# Patient Record
Sex: MALE | Race: BLACK OR AFRICAN AMERICAN | Employment: UNEMPLOYED | URBAN - METROPOLITAN AREA
[De-identification: names, ages, dates, MRNs, and addresses within clinical notes are randomized per-mention and may not be internally consistent; named-entity substitution may affect disease eponyms.]

---

## 2017-05-24 ENCOUNTER — OFFICE VISIT (OUTPATIENT)
Dept: FAMILY MEDICINE | Facility: CLINIC | Age: 67
End: 2017-05-24
Payer: COMMERCIAL

## 2017-05-24 VITALS — DIASTOLIC BLOOD PRESSURE: 98 MMHG | TEMPERATURE: 97 F | HEART RATE: 80 BPM | SYSTOLIC BLOOD PRESSURE: 158 MMHG

## 2017-05-24 DIAGNOSIS — Z71.84 TRAVEL ADVICE ENCOUNTER: Primary | ICD-10-CM

## 2017-05-24 DIAGNOSIS — Z23 NEED FOR VACCINATION: ICD-10-CM

## 2017-05-24 PROCEDURE — 90715 TDAP VACCINE 7 YRS/> IM: CPT | Mod: GA | Performed by: NURSE PRACTITIONER

## 2017-05-24 PROCEDURE — 90662 IIV NO PRSV INCREASED AG IM: CPT | Mod: GA | Performed by: NURSE PRACTITIONER

## 2017-05-24 PROCEDURE — 90670 PCV13 VACCINE IM: CPT | Mod: GA | Performed by: NURSE PRACTITIONER

## 2017-05-24 PROCEDURE — 90691 TYPHOID VACCINE IM: CPT | Mod: GA | Performed by: NURSE PRACTITIONER

## 2017-05-24 PROCEDURE — 99402 PREV MED CNSL INDIV APPRX 30: CPT | Mod: 25 | Performed by: NURSE PRACTITIONER

## 2017-05-24 PROCEDURE — 90472 IMMUNIZATION ADMIN EACH ADD: CPT | Mod: GA | Performed by: NURSE PRACTITIONER

## 2017-05-24 PROCEDURE — 90632 HEPA VACCINE ADULT IM: CPT | Mod: GA | Performed by: NURSE PRACTITIONER

## 2017-05-24 PROCEDURE — 90471 IMMUNIZATION ADMIN: CPT | Mod: GA | Performed by: NURSE PRACTITIONER

## 2017-05-24 RX ORDER — AZITHROMYCIN 500 MG/1
500 TABLET, FILM COATED ORAL DAILY
Qty: 3 TABLET | Refills: 0 | Status: SHIPPED | OUTPATIENT
Start: 2017-05-24

## 2017-05-24 RX ORDER — AZITHROMYCIN 500 MG/1
500 TABLET, FILM COATED ORAL DAILY
Qty: 3 TABLET | Refills: 0 | Status: SHIPPED | OUTPATIENT
Start: 2017-05-24 | End: 2017-05-24

## 2017-05-24 RX ORDER — ATOVAQUONE AND PROGUANIL HYDROCHLORIDE 250; 100 MG/1; MG/1
1 TABLET, FILM COATED ORAL DAILY
Qty: 94 TABLET | Refills: 0 | Status: SHIPPED | OUTPATIENT
Start: 2017-05-24

## 2017-05-24 RX ORDER — ATOVAQUONE AND PROGUANIL HYDROCHLORIDE 250; 100 MG/1; MG/1
1 TABLET, FILM COATED ORAL DAILY
Qty: 94 TABLET | Refills: 0 | Status: SHIPPED | OUTPATIENT
Start: 2017-05-24 | End: 2017-05-24

## 2017-05-24 NOTE — PROGRESS NOTES
Nurse Note      Itinerary:  [facility]       Departure Date: 2017      Return Date: 2017      Length of Trip 3 months       Reason for Travel: Brother passed away             Urban or rural: both      Accommodations: Family home        IMMUNIZATION HISTORY  Have you received any immunizations within the past 4 weeks?  No  Have you ever fainted from having your blood drawn or from an injection?  No  Have you ever had a fever reaction to vaccination?  No  Have you ever had any bad reaction or side effect from any vaccination?  No  Have you ever had hepatitis A or B vaccine?  No  Do you live (or work closely) with anyone who has AIDS, an AIDS-like condition, any other immune disorder or who is on chemotherapy for cancer?  No  Do you have a family history of immunodeficiency?  No  Have you received any injection of immune globulin or any blood products during the past 12 months?  No    Patient roomed by LUIS A Castñaeda  Augusta Avalos is a 67 year old male seen today with spouse for counsultation for international travel to [facility] for Visiting friends and relatives.  Patient will be departing in  6 day(s) and staying for   3 month(s) and  traveling alone.      Patient itinerary :  will be in the [place] [facility] [place] of [facility] which presents risk for Malaria, Dengue Fever, Chikungungya, Zika,  Trypanosomiasis, Schistosomiasis, Rabies, food borne illnesses, motor vehicle accidents, Typhoid, Leishmaniasis and Lassa Fever. exposure.      Patient's activities will include visiting friends and relatives and brothers .    Patient's country of birth is ?    Special medical concerns: Diabetes Type 1, hypertension, undervaccinated  Pre-travel questionnaire was completed by patient and reviewed by provider.     Vitals: BP (!) 158/98  Pulse 80  Temp 97  F (36.1  C) (Oral)  BMI= There is no height or weight on file to calculate BMI.    EXAM:  General:  Well-nourished,  well-developed in no acute distress.  Appears to be stated age, interacts appropriately and expresses understanding of information given to patient.    Current Outpatient Prescriptions   Medication Sig Dispense Refill     azithromycin (ZITHROMAX) 500 MG tablet Take 1 tablet (500 mg) by mouth daily Take 1 tablet a day for up to 3 days for severe diarrhea 3 tablet 0     atovaquone-proguanil (MALARONE) 250-100 MG per tablet Take 1 tablet by mouth daily Start 2 days before exposure to Malaria and continue daily till  7 days after exposure. 94 tablet 0     METFORMIN HCL PO Take 1,000 mg by mouth 2 times daily (with meals)       HYDROCHLOROTHIAZIDE PO Take 12.5 mg by mouth daily       SitaGLIPtin Phosphate (JANUVIA PO) Take 100 mg by mouth daily       SIMVASTATIN PO Take 20 mg by mouth At Bedtime       LISINOPRIL PO Take 10 mg by mouth daily       GLYBURIDE PO Take 5 mg by mouth 2 times daily (with meals)       There is no problem list on file for this patient.    No Known Allergies      Immunizations discussed include:   Hepatitis A:  Ordered/given today, risks, benefits and side effects reviewed  Hepatitis B: Declined  Not concerned about risk of disease  Influenza: Ordered/given today, risks, benefits and side effects reviewed  Typhoid: Ordered/given today, risks, benefits and side effects reviewed  Rabies: Declined  Not concerned about risk of disease  Yellow Fever: Declined  Not concerned about risk of disease, low risk due to destinations. Advanced age, type 1 diabetes  Japanese Encephalitis: Not indicated  Meningococcus: Not indicated  Tetanus/Diphtheria: Ordered/given today, risks, benefits and side effects reviewed  Measles/Mumps/Rubella: Immune by disease history per patient report  Cholera: Not needed  Polio: Up to date  Pneumococcal: Ordered/given today  Varicella: Declined  Not concerned about risk of disease  Zostavax:  deferred  HPV:  Not indicated  TB:  Possible post travel     Altitude Exposure on this  trip: no    ASSESSMENT/PLAN:    ICD-10-CM    1. Travel advice encounter Z71.89 TYPHOID VACCINE, IM     TDAP (ADACEL)     HEPA VACCINE ADULT IM     PNEUMOCOCCAL CONJ VACCINE 13 VALENT IM     azithromycin (ZITHROMAX) 500 MG tablet     atovaquone-proguanil (MALARONE) 250-100 MG per tablet     FLU VACCINE, INCREASED ANTIGEN, PRESV FREE     DISCONTINUED: azithromycin (ZITHROMAX) 500 MG tablet     DISCONTINUED: atovaquone-proguanil (MALARONE) 250-100 MG per tablet     CANCELED: HC FLU VAC PRESRV FREE QUAD SPLIT VIR 3+YRS IM   2. Need for vaccination Z23 TYPHOID VACCINE, IM     TDAP (ADACEL)     HEPA VACCINE ADULT IM     PNEUMOCOCCAL CONJ VACCINE 13 VALENT IM     FLU VACCINE, INCREASED ANTIGEN, PRESV FREE     CANCELED: HC FLU VAC PRESRV FREE QUAD SPLIT VIR 3+YRS IM     I have reviewed general recommendations for safe travel   including: food/water precautions, insect precautions, safer sex   practices given high prevalence of Zika, HIV and other STDs,   roadway safety. Educational materials and [employer] report provided.    Malaraia prophylaxis recommended: Malarone  Symptomatic treatment for traveler's diarrhea: azithromycin  Altitude illness prevention and treatment: no      Evacuation insurance advised and resources were provided to patient.    Total visit time 30 minutes  with over 50% of time spent counseling patient as detailed above.    Dayana Poe CNP

## 2017-05-24 NOTE — NURSING NOTE
Chief Complaint   Patient presents with     Travel Clinic     [facility]      BP (!) 170/108  Pulse 80  Temp 97  F (36.1  C) (Oral) There is no height or weight on file to calculate BMI.  bp completed using cuff size: large       Health Maintenance addressed:  BP was high, used pink card, recheck manually    Possibly completing today per provider review.    Martine Kevin MA

## 2017-05-24 NOTE — PATIENT INSTRUCTIONS
Today May 24, 2017 you received the    Pneumococcal 13    Hepatitis A Vaccine -     Flu shot    Tetanus (Tdap) Vaccine    Typhoid - injectable. This vaccine is valid for two years.   .    These appointments can be made as a NURSE ONLY visit.    **It is very important for the vaccinations to be given on the scheduled day(s), this helps ensure you receive the full effectiveness of the vaccine.**    Please call [facility] with any questions [phone]    Thank you for visiting [facility]

## 2017-05-24 NOTE — MR AVS SNAPSHOT
After Visit Summary   5/24/2017    Augusta Avalos    MRN: [id number]           Patient Information     Date Of Birth          1950        Visit Information        Provider Department      5/24/2017 11:30 AM Dayana Poe APRN CNP [facility]        Today's Diagnoses     Travel advice encounter    -  1    Need for vaccination          Care Instructions    Today May 24, 2017 you received the    Pneumococcal 13    Hepatitis A Vaccine -     Flu shot    Tetanus (Tdap) Vaccine    Typhoid - injectable. This vaccine is valid for two years.   .    These appointments can be made as a NURSE ONLY visit.    **It is very important for the vaccinations to be given on the scheduled day(s), this helps ensure you receive the full effectiveness of the vaccine.**    Please call [facility] with any questions [phone]    Thank you for visiting [facility]              Follow-ups after your visit        Your next 10 appointments already scheduled     May 24, 2017 11:30 AM CDT   Office Visit with TARIQ Duarte CNP   [facility]    [address] [place]  [address] [place] [id number] [phone]           Bring a current list of meds and any records pertaining to this visit.  For Physicals, please bring immunization records and any forms needing to be filled out.  Please arrive 10 minutes early to complete paperwork.              Who to contact     If you have questions or need follow up information about today's clinic visit or your schedule please contact [facility] directly at [phone].  Normal or non-critical lab and imaging results will be communicated to you by MyChart, letter or phone within 4 business days after the clinic has received the results. If you do not hear from us within 7 days, please contact the clinic through MyChart or phone. If you have a critical or abnormal lab  "result, we will notify you by phone as soon as possible.  Submit refill requests through [facility] or call your pharmacy and they will forward the refill request to us. Please allow 3 business days for your refill to be completed.          Additional Information About Your Visit        [employer] Information     TakeCare lets you send messages to your doctor, view your test results, renew your prescriptions, schedule appointments and more. To sign up, go to [email or web address] . Click on \"Log in\" on the left side of the screen, which will take you to the Welcome page. Then click on \"Sign up Now\" on the right side of the page.     You will be asked to enter the access code listed below, as well as some personal information. Please follow the directions to create your username and password.     Your access code is: [email or web address]  Expires: 2017 11:26 AM     Your access code will  in 90 days. If you need help or a new code, please call your [place] clinic or [phone].        Care EveryWhere ID     This is your Care EveryWhere ID. This could be used by other organizations to access your [place] medical records  [id number]        Your Vitals Were     Pulse Temperature                80 97  F (36.1  C) (Oral)           Blood Pressure from Last 3 Encounters:   17 (!) 170/108   13 132/84   13 127/83    Weight from Last 3 Encounters:   No data found for Wt              We Performed the Following     HC FLU VAC PRESRV FREE QUAD SPLIT VIR 3+YRS IM     HEPA VACCINE ADULT IM     PNEUMOCOCCAL CONJ VACCINE 13 VALENT IM     TDAP (ADACEL)     TYPHOID VACCINE, IM          Today's Medication Changes          These changes are accurate as of: 17 11:28 AM.  If you have any questions, ask your nurse or doctor.               Start taking these medicines.        Dose/Directions    atovaquone-proguanil 250-100 MG per tablet   Commonly known as:  MALARONE   Used for:  Travel advice encounter   Started " by:  Dayana Poe APRN CNP        Dose:  1 tablet   Take 1 tablet by mouth daily Start 2 days before exposure to Malaria and continue daily till  7 days after exposure.   Quantity:  94 tablet   Refills:  0       azithromycin 500 MG tablet   Commonly known as:  ZITHROMAX   Used for:  Travel advice encounter   Started by:  Dayana Poe APRN CNP        Dose:  500 mg   Take 1 tablet (500 mg) by mouth daily for 3 doses Take 1 tablet a day for up to 3 days for severe diarrhea   Quantity:  3 tablet   Refills:  0            Where to get your medicines      These medications were sent to [facility] [id number] [address] AT [facility] & [address] [address]     Phone:  [phone]     atovaquone-proguanil 250-100 MG per tablet    azithromycin 500 MG tablet                Primary Care Provider    Physician No Ref-Primary       No address on file        Thank you!     Thank you for choosing [facility]  for your care. Our goal is always to provide you with excellent care. Hearing back from our patients is one way we can continue to improve our services. Please take a few minutes to complete the written survey that you may receive in the mail after your visit with us. Thank you!             Your Updated Medication List - Protect others around you: Learn how to safely use, store and throw away your medicines at www.disposemymeds.org.          This list is accurate as of: 5/24/17 11:28 AM.  Always use your most recent med list.                   Brand Name Dispense Instructions for use    atovaquone-proguanil 250-100 MG per tablet    MALARONE    94 tablet    Take 1 tablet by mouth daily Start 2 days before exposure to Malaria and continue daily till  7 days after exposure.       azithromycin 500 MG tablet    ZITHROMAX    3 tablet    Take 1 tablet (500 mg) by mouth daily for 3 doses Take 1 tablet a day for up to 3 days for severe diarrhea        GLYBURIDE PO      Take 5 mg by mouth 2 times daily (with meals)       HYDROCHLOROTHIAZIDE PO      Take 12.5 mg by mouth daily       JANUVIA PO      Take 100 mg by mouth daily       LISINOPRIL PO      Take 10 mg by mouth daily       METFORMIN HCL PO      Take 1,000 mg by mouth 2 times daily (with meals)       SIMVASTATIN PO      Take 20 mg by mouth At Bedtime

## 2017-05-24 NOTE — LETTER
05/24/2017        Name of traveler:  Augusta Avalos  YOB: 1950  Country of travel:  [facility]  Dates of travel:  05/30/2017-08/23/2017      To whom it may concern,      Immunization Exemption Letter: Yellow Fever  The traveler named above is my patient and under my medical care and must be exempted from the requirement for yellow fever immunization. This exemption is valid only for the current trip (noted above).  The risk of the vaccine is greater than the risk of disease.    Dayana Poe CNP

## 2017-05-24 NOTE — NURSING NOTE
Screening Questionnaire for Adult Immunization    Are you sick today?   No   Do you have allergies to medications, food, a vaccine component or latex?   No   Have you ever had a serious reaction after receiving a vaccination?   No   Do you have a long-term health problem with heart disease, lung disease, asthma, kidney disease, metabolic disease (e.g. diabetes), anemia, or other blood disorder?   No   Do you have cancer, leukemia, HIV/AIDS, or any other immune system problem?   No   In the past 3 months, have you taken medications that affect  your immune system, such as prednisone, other steroids, or anticancer drugs; drugs for the treatment of rheumatoid arthritis, Crohn s disease, or psoriasis; or have you had radiation treatments?   No   Have you had a seizure, or a brain or other nervous system problem?   No   During the past year, have you received a transfusion of blood or blood     products, or been given immune (gamma) globulin or antiviral drug?   No   For women: Are you pregnant or is there a chance you could become        pregnant during the next month?   No   Have you received any vaccinations in the past 4 weeks?   No     Immunization questionnaire answers were all negative.      MNVFC doesn't apply on this patient    Prior to injection verified patient identity using patient's name and date of birth.    Per orders of RENE Poe, injection of Hep A, Typhoid, Adacel, PCV 13, and Flu given by Martine Kevin. Patient instructed to remain in clinic for 20 minutes afterwards, and to report any adverse reaction to me immediately.       Screening performed by Martine Kevin on 5/24/2017 at 11:50 AM.